# Patient Record
(demographics unavailable — no encounter records)

---

## 2025-04-02 NOTE — HISTORY OF PRESENT ILLNESS
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  Patient denies any breast masses or nipple discharge.  Last December she had a contrast-enhanced mammogram which revealed a subcentimeter enhancing lesion in the right breast at the 8 o'clock position, 4 cm from the nipple.  When she went back to have a right ultrasound-guided core biopsy of this region the area could not be found and he was given, BI-RADS 1.  The biopsy was canceled.